# Patient Record
Sex: MALE | Race: WHITE | URBAN - METROPOLITAN AREA
[De-identification: names, ages, dates, MRNs, and addresses within clinical notes are randomized per-mention and may not be internally consistent; named-entity substitution may affect disease eponyms.]

---

## 2018-04-13 ENCOUNTER — OFFICE VISIT (OUTPATIENT)
Dept: FAMILY MEDICINE CLINIC | Facility: CLINIC | Age: 23
End: 2018-04-13

## 2018-04-13 VITALS
HEART RATE: 78 BPM | SYSTOLIC BLOOD PRESSURE: 118 MMHG | WEIGHT: 126 LBS | BODY MASS INDEX: 19.1 KG/M2 | TEMPERATURE: 99 F | HEIGHT: 68 IN | DIASTOLIC BLOOD PRESSURE: 68 MMHG

## 2018-04-13 DIAGNOSIS — Z00.00 HEALTH CARE MAINTENANCE: Primary | ICD-10-CM

## 2018-04-13 DIAGNOSIS — S39.012A BACK STRAIN, INITIAL ENCOUNTER: ICD-10-CM

## 2018-04-13 PROCEDURE — 99395 PREV VISIT EST AGE 18-39: CPT | Performed by: FAMILY MEDICINE

## 2018-04-13 RX ORDER — DEXTROAMPHETAMINE SACCHARATE, AMPHETAMINE ASPARTATE, DEXTROAMPHETAMINE SULFATE AND AMPHETAMINE SULFATE 1.25; 1.25; 1.25; 1.25 MG/1; MG/1; MG/1; MG/1
1 TABLET ORAL
COMMUNITY
Start: 2013-08-29 | End: 2021-02-03

## 2018-04-13 RX ORDER — LORATADINE 10 MG/1
1 TABLET ORAL AS NEEDED
COMMUNITY
Start: 2009-02-18 | End: 2021-12-03

## 2018-04-13 NOTE — PROGRESS NOTES
[facility]  PROGRESS NOTE  Chief Complaint:   Patient presents with:  Physical      HPI:   This is a 25year old male coming in for yearly physical which the patient needs to remain on his Vyvanse he takes for management of his ADD.   Ely Nagel Smokeless tobacco: Never Used                        Family History:  Family History   Problem Relation Age of Onset   • Hypertension Father    • Diabetes Father    • Other Gwenevere John Father      back surgery   • Hypertension Mother    • Hypertension Mate depression or anxiety. ENDOCRINOLOGIC:  Denies excessive sweating, cold or heat intolerance, polyuria or polydipsia. ALLERGIES:  Denies allergic response, history of asthma, sneezing, hives, eczema or rhinitis.      EXAM:   /68 (BP Location: Right a initial encounter    A/P: Patient here for yearly physical but also with complaint of some discomfort in his back. Patient is fairly normal exam except he does show some mild spasm to the left paraspinal muscles and some mild left SI notch tenderness.   Mendy Leon

## 2018-08-30 ENCOUNTER — OFFICE VISIT (OUTPATIENT)
Dept: FAMILY MEDICINE CLINIC | Facility: CLINIC | Age: 23
End: 2018-08-30
Payer: COMMERCIAL

## 2018-08-30 VITALS
HEIGHT: 68 IN | WEIGHT: 127 LBS | SYSTOLIC BLOOD PRESSURE: 124 MMHG | DIASTOLIC BLOOD PRESSURE: 82 MMHG | BODY MASS INDEX: 19.25 KG/M2 | TEMPERATURE: 98 F | HEART RATE: 72 BPM

## 2018-08-30 DIAGNOSIS — Z20.2 STD EXPOSURE: Primary | ICD-10-CM

## 2018-08-30 PROCEDURE — 99214 OFFICE O/P EST MOD 30 MIN: CPT | Performed by: FAMILY MEDICINE

## 2018-08-30 RX ORDER — LISDEXAMFETAMINE DIMESYLATE 70 MG/1
1 CAPSULE ORAL DAILY
COMMUNITY
Start: 2018-08-29 | End: 2021-02-10

## 2018-08-30 RX ORDER — AZITHROMYCIN 500 MG/1
1000 TABLET, FILM COATED ORAL DAILY
Qty: 2 TABLET | Refills: 0 | Status: SHIPPED | OUTPATIENT
Start: 2018-08-30 | End: 2018-08-31

## 2018-08-31 NOTE — PROGRESS NOTES
[facility]  PROGRESS NOTE  Chief Complaint:   Patient presents with:  STD: girlfriend positive for chlamydia      HPI:   This is a 26year old male coming in for chlamydia. Patient is using condoms with any type of contact.   Patient's h fatigue. SEE HPI  EENT:  Eyes:  Denies eye pain, visual loss, blurred vision, double vision or yellow sclerae. Ears, Nose, Throat:  Denies hearing loss, sneezing, congestion, runny nose or sore throat.   INTEGUMENTARY:  Denies rashes, itching, skin lesion, o ulcerations, good dentition. NECK: Supple, , no JVD, no thyromegaly. SKIN: No rashes, no skin lesion, no bruising, good turgor. HEART:  Regular rate and rhythm, no murmurs, rubs or gallops.   LUNGS: Clear to auscultation bilterally, no rales/rhonchi/whee disorder      Yeni Kapoor MD  8/31/2018  7:47 AM

## 2021-01-20 ENCOUNTER — TELEPHONE (OUTPATIENT)
Dept: FAMILY MEDICINE CLINIC | Facility: CLINIC | Age: 26
End: 2021-01-20

## 2021-01-20 NOTE — TELEPHONE ENCOUNTER
Spoke with patient's mother Gloria Aparicio. She states that patient used to see Dr. Roslyn Middleton and another doctor in [place] to prescribe ADHD medications.      Since Dr. Roslyn Middleton retired and insurance issues, patient needs to find a new PCP and new provider to prescri

## 2021-01-20 NOTE — TELEPHONE ENCOUNTER
because of insurance wants to know if Dr. Maty Acuña will prescribe his ADD meds or needs to change primary

## 2021-02-02 ENCOUNTER — TELEPHONE (OUTPATIENT)
Dept: FAMILY MEDICINE CLINIC | Facility: CLINIC | Age: 26
End: 2021-02-02

## 2021-02-02 NOTE — TELEPHONE ENCOUNTER
Had Covid test within the last 14 days, negative  Future Appointments   Date Time Provider Swapna Betancourt   2/3/2021  4:00 PM Anette Jane MD [facility]

## 2021-02-04 NOTE — PROGRESS NOTES
[address]  PROGRESS NOTE  Chief Complaint:   Patient presents with:  ADD: patient has been seeing Dr.Natalie colon in [place]. Follow - Up      HPI:   This is a 26year old male coming in for establishing care in our office.   He prev congestion, runny nose or sore throat. INTEGUMENTARY:  Denies rashes, itching, skin lesion, or excessive skin dryness.   CARDIOVASCULAR:  Denies chest pain, chest pressure, chest discomfort, palpitations, edema, dyspnea on exertion or at rest.  RESPIRATORY rhythm, no murmurs, rubs or gallops. LUNGS: Clear to auscultation bilterally, no rales/rhonchi/wheezing. ABDOMEN:  Soft, nondistended, nontender, bowel sounds normal in all 4 quadrants, no masses, no hepatosplenomegaly. ASSESSMENT AND PLAN:   1.  Att

## 2021-02-10 NOTE — TELEPHONE ENCOUNTER
Future appt:    Last Appointment with provider:   2/3/2021  Last appointment at [facility]:  2/3/2021  No results found for: CHOLEST, HDL, LDL, TRIGLY, TRIG  No results found for: EAG, A1C  No results found for: T4F, TSH, TSHT4    No follow-ups on file.

## 2021-03-26 RX ORDER — LISDEXAMFETAMINE DIMESYLATE 70 MG/1
CAPSULE ORAL
Qty: 30 CAPSULE | Refills: 0 | Status: SHIPPED | OUTPATIENT
Start: 2021-03-26 | End: 2021-04-28

## 2021-03-26 NOTE — TELEPHONE ENCOUNTER
Future appt:    Last Appointment with provider:   2/3/2021  Last appointment at [facility]:  2/3/2021  No results found for: CHOLEST, HDL, LDL, TRIGLY, TRIG  No results found for: EAG, A1C  No results found for: T4F, TSH, TSHT4    No follow-ups on file.

## 2021-04-28 NOTE — TELEPHONE ENCOUNTER
Future appt:    Last Appointment with provider:   2/3/2021  Last appointment at [facility]:  2/3/2021  No results found for: CHOLEST, HDL, LDL, TRIGLY, TRIG  No results found for: EAG, A1C  No results found for: T4F, TSH, TSHT4    No follow-ups on file.

## 2021-06-16 RX ORDER — LISDEXAMFETAMINE DIMESYLATE 70 MG/1
CAPSULE ORAL
Qty: 30 CAPSULE | Refills: 0 | Status: SHIPPED | OUTPATIENT
Start: 2021-06-16 | End: 2021-08-04

## 2021-06-16 NOTE — TELEPHONE ENCOUNTER
Future appt:    Last Appointment with provider:   2/3/2021  Last appointment at [facility]:  2/3/2021  No results found for: CHOLEST, HDL, LDL, TRIGLY, TRIG  No results found for: EAG, A1C  No results found for: T4F, TSH, TSHT4    No follow-ups on file.

## 2021-08-04 ENCOUNTER — TELEPHONE (OUTPATIENT)
Dept: FAMILY MEDICINE CLINIC | Facility: CLINIC | Age: 26
End: 2021-08-04

## 2021-08-04 RX ORDER — LISDEXAMFETAMINE DIMESYLATE 70 MG/1
CAPSULE ORAL
Qty: 30 CAPSULE | Refills: 0 | Status: SHIPPED | OUTPATIENT
Start: 2021-08-04 | End: 2021-09-23

## 2021-08-04 NOTE — TELEPHONE ENCOUNTER
Mom informed patient due for appt. She will have him call for appt.   Cecy Enricolinda, 08/04/21, 9:58 AM    Future appt:    Last Appointment with provider:  2/3/21    Last appointment at [facility]:  Visit date not found  No results found for: CHOLEST, HDL,

## 2021-08-04 NOTE — TELEPHONE ENCOUNTER
Mom will have patient call to schedule  ADD Follow Up.     Future appt:    Last Appointment with provider: 2/3/21    Last appointment at [facility]:  Visit date not found  No results found for: CHOLEST, HDL, LDL, TRIGLY, TRIG  No results found for: EAG, A

## 2021-09-23 NOTE — TELEPHONE ENCOUNTER
Future appt:     Your appointments     Date & Time Appointment Department [facility])    Oct 18, 2021  4:30 PM CDT Exam - Established with Chloe Fang MD [address] [place] [address]            Ingrid Marr

## 2021-09-23 NOTE — TELEPHONE ENCOUNTER
Future Appointments   Date Time Provider Swapna Asia   10/18/2021  4:30 PM Fanta Tafoya MD [facility]     Vyvanse to [place]. Has enough to get him through till 9/27/2021. Call only with questions or problems.

## 2021-11-10 ENCOUNTER — TELEPHONE (OUTPATIENT)
Dept: FAMILY MEDICINE CLINIC | Facility: CLINIC | Age: 26
End: 2021-11-10

## 2021-11-10 RX ORDER — LISDEXAMFETAMINE DIMESYLATE 70 MG/1
CAPSULE ORAL
Qty: 30 CAPSULE | Refills: 0 | Status: SHIPPED | OUTPATIENT
Start: 2021-11-10 | End: 2022-01-03

## 2021-11-10 NOTE — TELEPHONE ENCOUNTER
RF Mary    OTW til Friday    to [facility].       Future Appointments   Date Time Provider Swapna Laguerrei   12/3/2021  1:30 PM Chavez Hines MD [facility] [place]

## 2021-11-10 NOTE — TELEPHONE ENCOUNTER
Future appt:     Your appointments     Date & Time Appointment Department [facility])    Dec 03, 2021  1:30 PM CST Exam - Established with Vale Lawrence MD [address] [place] [address] [facility]            Chi Lancaster

## 2021-12-03 NOTE — PROGRESS NOTES
[facility]  PROGRESS NOTE  Chief Complaint:   Patient presents with:  ADD      HPI:   This is a 23year old male coming in for follow-up on his ADD medication. He reports that the Vyvanse has been doing exactly what he wants it to do. chest discomfort, palpitations, edema, dyspnea on exertion or at rest.  RESPIRATORY:  Denies shortness of breath, wheezing, cough or sputum. GASTROINTESTINAL:  Denies abdominal pain, nausea, vomiting, constipation, diarrhea, or blood in stool.   Bridget Duane bilaterally. NEURO:  No deficit, normal gait, strength and tone, sensory intact, normal reflexes. ASSESSMENT AND PLAN:   1. Attention deficit disorder (ADD) without hyperactivity  His ADD is well controlled with the current dose of Vyvanse.   His electr

## 2022-01-03 NOTE — TELEPHONE ENCOUNTER
Future appt:    Last Appointment with provider:   12/3/2021  Last appointment at [facility]:  12/3/2021  No results found for: CHOLEST, HDL, LDL, TRIGLY, TRIG  No results found for: EAG, A1C  No results found for: T4F, TSH, TSHT4    No follow-ups on file

## 2022-02-24 ENCOUNTER — TELEPHONE (OUTPATIENT)
Dept: FAMILY MEDICINE CLINIC | Facility: CLINIC | Age: 27
End: 2022-02-24

## 2022-02-24 NOTE — TELEPHONE ENCOUNTER
Future appt:     Last Appointment with provider:   12/3/2021; Return in about 6 months (around 6/3/2022). Last appointment at [facility]:  12/3/2021  No results found for: CHOLEST, HDL, LDL, TRIGLY, TRIG  No results found for: EAG, A1C  No results found for: T4F, TSH, TSHT4    Last RF:  1/3/2022    No follow-ups on file.

## 2022-04-12 NOTE — TELEPHONE ENCOUNTER
needs a refill on his generic vyvanse 70mg  pharmacy:  Lana 68 his last pill today. No future appointments.

## 2022-04-12 NOTE — TELEPHONE ENCOUNTER
Vyvance: 2/24/22    Future appt:    Last Appointment with provider:   12/3/2021  Last appointment at [facility]:  12/3/2021  No results found for: CHOLEST, HDL, LDL, TRIGLY, TRIG  No results found for: EAG, A1C  No results found for: T4F, TSH, TSHT4    No follow-ups on file.

## 2022-05-20 NOTE — TELEPHONE ENCOUNTER
Future appt: Your appointments     Date & Time Appointment Department [facility])    Ger 10, 2022 11:30 AM CDT Exam - Established with Robe Miguel MD [address] [place] [address])            [address] [place] [address]  [phone]        Last Appointment with provider:   12/3/2021  Last appointment at [facility]:  12/3/2021  No results found for: CHOLEST, HDL, LDL, TRIGLY, TRIG  No results found for: EAG, A1C  No results found for: T4F, TSH, TSHT4    No follow-ups on file.

## 2022-06-10 NOTE — PATIENT INSTRUCTIONS
Continue current medications  ADD stable with medication. Monitor for any side effect. Avoid any use of coffee or energy drink.

## 2022-07-06 DIAGNOSIS — F98.8 ATTENTION DEFICIT DISORDER (ADD) WITHOUT HYPERACTIVITY: ICD-10-CM

## 2022-07-06 NOTE — TELEPHONE ENCOUNTER
Future appt:    Last Appointment with provider:   Visit date not found  Last appointment at [facility]:  6/10/2022  / ADD    No results found for: CHOLEST, HDL, LDL, TRIGLY, TRIG  No results found for: EAG, A1C  No results found for: T4F, TSH, TSHT4    No follow-ups on file.

## 2022-10-01 DIAGNOSIS — F98.8 ATTENTION DEFICIT DISORDER (ADD) WITHOUT HYPERACTIVITY: ICD-10-CM

## 2022-10-03 RX ORDER — LISDEXAMFETAMINE DIMESYLATE 70 MG/1
CAPSULE ORAL
Qty: 30 CAPSULE | Refills: 0 | Status: SHIPPED | OUTPATIENT
Start: 2022-10-03

## 2022-10-03 NOTE — TELEPHONE ENCOUNTER
Vyvance: 7/6/22    Future appt:    Last Appointment with provider:   Visit date not found  Last appointment at [facility]:  6/10/2022   ADD  No results found for: CHOLEST, HDL, LDL, TRIGLY, TRIG  No results found for: EAG, A1C  No results found for: T4F, TSH, TSHT4    No follow-ups on file.

## 2022-11-10 DIAGNOSIS — F98.8 ATTENTION DEFICIT DISORDER (ADD) WITHOUT HYPERACTIVITY: ICD-10-CM

## 2022-11-10 NOTE — TELEPHONE ENCOUNTER
Future appt:    Last Appointment with provider:   Visit date not found  Last appointment at [facility]:  6/10/2022(ADD w/ nancy)-f/u 6 mo(12/10/22)    VYVANSE 70 MG Oral Cap    30caps  0refill        Filled:10/3/22 Summary: TAKE ONE CAPSULE BY MOUTH DAILY          No results found for: CHOLEST, HDL, LDL, TRIGLY, TRIG  No results found for: EAG, A1C  No results found for: T4F, TSH, TSHT4    No follow-ups on file.

## 2022-11-10 NOTE — TELEPHONE ENCOUNTER
Mother calling requesting refill of Vyvanse to be sent to [facility] in [place].      Please advise

## 2023-02-01 DIAGNOSIS — F98.8 ATTENTION DEFICIT DISORDER (ADD) WITHOUT HYPERACTIVITY: ICD-10-CM

## 2023-02-01 NOTE — TELEPHONE ENCOUNTER
Future appt: Your appointments     Date & Time Appointment Department [facility])    Feb 10, 2023  4:30 PM CST Exam - Established with Robe Miguel MD [address], [place] ([place])            [address]  [facility] [id number]  [phone]        Last Appointment with provider:   6/22  Last appointment at [facility]:  Visit date not found  No results found for: CHOLEST, HDL, LDL, TRIGLY, TRIG  No results found for: EAG, A1C  No results found for: T4F, TSH, TSHT4    No follow-ups on file.

## 2023-03-21 DIAGNOSIS — F98.8 ATTENTION DEFICIT DISORDER (ADD) WITHOUT HYPERACTIVITY: ICD-10-CM

## 2023-03-21 NOTE — TELEPHONE ENCOUNTER
Future appt:    Last Appointment with provider:   Visit date not found  Last appointment at [facility]:    2/22/2023   ADD  No results found for: CHOLEST, HDL, LDL, TRIGLY, TRIG  No results found for: EAG, A1C  No results found for: T4F, TSH, TSHT4    No follow-ups on file.

## 2023-05-09 ENCOUNTER — TELEPHONE (OUTPATIENT)
Dept: FAMILY MEDICINE CLINIC | Facility: CLINIC | Age: 28
End: 2023-05-09

## 2023-05-09 DIAGNOSIS — F98.8 ATTENTION DEFICIT DISORDER (ADD) WITHOUT HYPERACTIVITY: ICD-10-CM

## 2023-05-09 RX ORDER — LISDEXAMFETAMINE DIMESYLATE 70 MG/1
CAPSULE ORAL
Qty: 30 CAPSULE | Refills: 0 | Status: SHIPPED | OUTPATIENT
Start: 2023-05-09

## 2023-05-09 NOTE — TELEPHONE ENCOUNTER
Duplicate Request    Future appt:    Last Appointment with provider:   Visit date not found  Last appointment at [facility]:    2/22/2023  ADD  No results found for: CHOLEST, HDL, LDL, TRIGLY, TRIG  No results found for: EAG, A1C  No results found for: T4F, TSH, TSHT4    No follow-ups on file.

## 2023-05-09 NOTE — TELEPHONE ENCOUNTER
Last appt 2/22/23 advised Return in about 6 months (around 8/22/2023) for follow up. No future appointments.

## 2023-06-14 DIAGNOSIS — F98.8 ATTENTION DEFICIT DISORDER (ADD) WITHOUT HYPERACTIVITY: ICD-10-CM

## 2023-07-24 DIAGNOSIS — F98.8 ATTENTION DEFICIT DISORDER (ADD) WITHOUT HYPERACTIVITY: ICD-10-CM

## 2023-07-24 NOTE — TELEPHONE ENCOUNTER
Last refill - 6/14/23 #30 w/0 refills    Last px - 4/13/18    Future appt:    Last Appointment with provider:   Visit date not found  Last appointment at [facility]:  2/22/2023 - Dr. Gaston Madsen ADD follow up    No results found for: CHOLEST, HDL, LDL, TRIGLY, TRIG  No results found for: EAG, A1C  No results found for: T4F, TSH, TSHT4    No follow-ups on file.

## 2023-08-28 DIAGNOSIS — F98.8 ATTENTION DEFICIT DISORDER (ADD) WITHOUT HYPERACTIVITY: ICD-10-CM

## 2023-08-28 NOTE — TELEPHONE ENCOUNTER
Future appt:    Last Appointment with provider:  2/2023  Last appointment at [facility]:  Visit date not found  No results found for: CHOLEST, HDL, LDL, TRIGLY, TRIG  No results found for: EAG, A1C  No results found for: T4F, TSH, TSHT4    No follow-ups on file.

## 2023-08-29 ENCOUNTER — TELEPHONE (OUTPATIENT)
Dept: FAMILY MEDICINE CLINIC | Facility: CLINIC | Age: 28
End: 2023-08-29

## 2023-09-27 ENCOUNTER — TELEPHONE (OUTPATIENT)
Dept: FAMILY MEDICINE CLINIC | Facility: CLINIC | Age: 28
End: 2023-09-27

## 2023-09-27 DIAGNOSIS — F98.8 ATTENTION DEFICIT DISORDER (ADD) WITHOUT HYPERACTIVITY: ICD-10-CM

## 2023-09-27 RX ORDER — LISDEXAMFETAMINE DIMESYLATE CAPSULES 70 MG/1
70 CAPSULE ORAL EVERY MORNING
Qty: 30 CAPSULE | Refills: 0 | Status: SHIPPED | OUTPATIENT
Start: 2023-09-27

## 2023-09-27 NOTE — TELEPHONE ENCOUNTER
V[facility]vanse Rx needs [facility]. Call only with questions or problems.

## 2023-09-27 NOTE — TELEPHONE ENCOUNTER
Future appt:    Last Appointment with provider:   Visit date not found  Last appointment at [facility]:  9/26/2023  No results found for: \"CHOLEST\", \"HDL\", \"LDL\", \"TRIGLY\", \"TRIG\"  No results found for: \"EAG\", \"A1C\"  No results found for: \"T4F\", \"TSH\", \"TSHT4\"    No follow-ups on file.